# Patient Record
Sex: FEMALE | Race: BLACK OR AFRICAN AMERICAN
[De-identification: names, ages, dates, MRNs, and addresses within clinical notes are randomized per-mention and may not be internally consistent; named-entity substitution may affect disease eponyms.]

---

## 2019-02-05 ENCOUNTER — HOSPITAL ENCOUNTER (EMERGENCY)
Dept: HOSPITAL 92 - ERS | Age: 29
Discharge: HOME | End: 2019-02-05
Payer: SELF-PAY
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
## 2019-02-05 DIAGNOSIS — S92.415A: Primary | ICD-10-CM

## 2019-02-05 NOTE — RAD
LEFT FOOT THREE VIEWS:

 

HISTORY:

Left foot and toe pain.

 

FINDINGS:

Lisfranc joint alignment is anatomic.  Pes planus on the lateral view.  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
nce of a congenital anomaly.

 

Nondisplaced oblique fracture of the proximal phalanx, big toe, extends from the proximal medial shaf
t to the lateral head.  No intraarticular extension is apparent.

 

IMPRESSION:

Left big toe fracture, nondisplaced.

 

POS: DANIELLE

## 2022-01-25 ENCOUNTER — HOSPITAL ENCOUNTER (EMERGENCY)
Dept: HOSPITAL 92 - ERS | Age: 32
Discharge: HOME | End: 2022-01-25
Payer: COMMERCIAL

## 2022-01-25 DIAGNOSIS — J45.909: ICD-10-CM

## 2022-01-25 DIAGNOSIS — U07.1: Primary | ICD-10-CM

## 2022-01-25 LAB — SARS-COV-2 RNA RESP QL NAA+PROBE: DETECTED

## 2022-01-25 PROCEDURE — U0005 INFEC AGEN DETEC AMPLI PROBE: HCPCS

## 2022-01-25 PROCEDURE — U0003 INFECTIOUS AGENT DETECTION BY NUCLEIC ACID (DNA OR RNA); SEVERE ACUTE RESPIRATORY SYNDROME CORONAVIRUS 2 (SARS-COV-2) (CORONAVIRUS DISEASE [COVID-19]), AMPLIFIED PROBE TECHNIQUE, MAKING USE OF HIGH THROUGHPUT TECHNOLOGIES AS DESCRIBED BY CMS-2020-01-R: HCPCS

## 2022-01-25 PROCEDURE — 99283 EMERGENCY DEPT VISIT LOW MDM: CPT
